# Patient Record
Sex: MALE | Race: WHITE | Employment: FULL TIME | RURAL
[De-identification: names, ages, dates, MRNs, and addresses within clinical notes are randomized per-mention and may not be internally consistent; named-entity substitution may affect disease eponyms.]

---

## 2017-08-23 ENCOUNTER — HOSPITAL ENCOUNTER (OUTPATIENT)
Dept: OTHER | Age: 57
Discharge: HOME | End: 2017-08-23
Attending: INTERNAL MEDICINE | Admitting: INTERNAL MEDICINE

## 2017-08-24 LAB — SEDIMENTATION RATE, ERYTHROCYTE: 4 MM/HR (ref 0–10)

## 2017-11-16 ENCOUNTER — HOSPITAL ENCOUNTER (OUTPATIENT)
Dept: OTHER | Age: 57
Discharge: HOME | End: 2017-11-16
Attending: INTERNAL MEDICINE | Admitting: INTERNAL MEDICINE

## 2018-03-30 ENCOUNTER — HOSPITAL ENCOUNTER (OUTPATIENT)
Dept: OTHER | Age: 58
Discharge: HOME | End: 2018-03-30
Attending: INTERNAL MEDICINE | Admitting: INTERNAL MEDICINE

## 2024-05-25 PROBLEM — M06.9 RHEUMATOID ARTHRITIS: Status: ACTIVE | Noted: 2024-05-25

## 2024-05-28 ENCOUNTER — LAB (OUTPATIENT)
Facility: HOSPITAL | Age: 64
End: 2024-05-28
Payer: MEDICARE

## 2024-05-28 ENCOUNTER — OFFICE VISIT (OUTPATIENT)
Age: 64
End: 2024-05-28
Payer: MEDICARE

## 2024-05-28 ENCOUNTER — TELEPHONE (OUTPATIENT)
Age: 64
End: 2024-05-28

## 2024-05-28 VITALS
HEIGHT: 71 IN | BODY MASS INDEX: 26.04 KG/M2 | WEIGHT: 186 LBS | HEART RATE: 82 BPM | SYSTOLIC BLOOD PRESSURE: 140 MMHG | DIASTOLIC BLOOD PRESSURE: 86 MMHG | TEMPERATURE: 98.5 F

## 2024-05-28 DIAGNOSIS — Z79.899 HIGH RISK MEDICATION USE: ICD-10-CM

## 2024-05-28 DIAGNOSIS — Z79.899 IMMUNOSUPPRESSION DUE TO DRUG THERAPY: ICD-10-CM

## 2024-05-28 DIAGNOSIS — M05.79 RHEUMATOID ARTHRITIS INVOLVING MULTIPLE SITES WITH POSITIVE RHEUMATOID FACTOR: ICD-10-CM

## 2024-05-28 DIAGNOSIS — M51.36 DDD (DEGENERATIVE DISC DISEASE), LUMBAR: ICD-10-CM

## 2024-05-28 DIAGNOSIS — D84.821 IMMUNOSUPPRESSION DUE TO DRUG THERAPY: ICD-10-CM

## 2024-05-28 DIAGNOSIS — M05.79 RHEUMATOID ARTHRITIS INVOLVING MULTIPLE SITES WITH POSITIVE RHEUMATOID FACTOR: Primary | ICD-10-CM

## 2024-05-28 LAB
ALBUMIN SERPL-MCNC: 3.9 G/DL (ref 3.5–5.2)
ALBUMIN/GLOB SERPL: 1.3 G/DL
ALP SERPL-CCNC: 120 U/L (ref 39–117)
ALT SERPL W P-5'-P-CCNC: 25 U/L (ref 1–41)
ANION GAP SERPL CALCULATED.3IONS-SCNC: 8.9 MMOL/L (ref 5–15)
AST SERPL-CCNC: 24 U/L (ref 1–40)
BILIRUB SERPL-MCNC: 0.4 MG/DL (ref 0–1.2)
BUN SERPL-MCNC: 15 MG/DL (ref 8–23)
BUN/CREAT SERPL: 16.1 (ref 7–25)
CALCIUM SPEC-SCNC: 9 MG/DL (ref 8.6–10.5)
CHLORIDE SERPL-SCNC: 107 MMOL/L (ref 98–107)
CO2 SERPL-SCNC: 23.1 MMOL/L (ref 22–29)
CREAT SERPL-MCNC: 0.93 MG/DL (ref 0.76–1.27)
DEPRECATED RDW RBC AUTO: 43.1 FL (ref 37–54)
EGFRCR SERPLBLD CKD-EPI 2021: 91.1 ML/MIN/1.73
ERYTHROCYTE [DISTWIDTH] IN BLOOD BY AUTOMATED COUNT: 13.4 % (ref 12.3–15.4)
ERYTHROCYTE [SEDIMENTATION RATE] IN BLOOD: 32 MM/HR (ref 0–20)
GLOBULIN UR ELPH-MCNC: 3 GM/DL
GLUCOSE SERPL-MCNC: 85 MG/DL (ref 65–99)
HCT VFR BLD AUTO: 46.2 % (ref 37.5–51)
HGB BLD-MCNC: 15.1 G/DL (ref 13–17.7)
MCH RBC QN AUTO: 28.7 PG (ref 26.6–33)
MCHC RBC AUTO-ENTMCNC: 32.7 G/DL (ref 31.5–35.7)
MCV RBC AUTO: 87.7 FL (ref 79–97)
PLATELET # BLD AUTO: 198 10*3/MM3 (ref 140–450)
PMV BLD AUTO: 10.8 FL (ref 6–12)
POTASSIUM SERPL-SCNC: 4.2 MMOL/L (ref 3.5–5.2)
PROT SERPL-MCNC: 6.9 G/DL (ref 6–8.5)
RBC # BLD AUTO: 5.27 10*6/MM3 (ref 4.14–5.8)
SODIUM SERPL-SCNC: 139 MMOL/L (ref 136–145)
WBC NRBC COR # BLD AUTO: 8.63 10*3/MM3 (ref 3.4–10.8)

## 2024-05-28 PROCEDURE — 85027 COMPLETE CBC AUTOMATED: CPT

## 2024-05-28 PROCEDURE — G2211 COMPLEX E/M VISIT ADD ON: HCPCS | Performed by: INTERNAL MEDICINE

## 2024-05-28 PROCEDURE — 85652 RBC SED RATE AUTOMATED: CPT

## 2024-05-28 PROCEDURE — 86431 RHEUMATOID FACTOR QUANT: CPT

## 2024-05-28 PROCEDURE — 80053 COMPREHEN METABOLIC PANEL: CPT

## 2024-05-28 PROCEDURE — 85007 BL SMEAR W/DIFF WBC COUNT: CPT

## 2024-05-28 PROCEDURE — 99214 OFFICE O/P EST MOD 30 MIN: CPT | Performed by: INTERNAL MEDICINE

## 2024-05-28 PROCEDURE — 86480 TB TEST CELL IMMUN MEASURE: CPT

## 2024-05-28 PROCEDURE — 1160F RVW MEDS BY RX/DR IN RCRD: CPT | Performed by: INTERNAL MEDICINE

## 2024-05-28 PROCEDURE — 36415 COLL VENOUS BLD VENIPUNCTURE: CPT

## 2024-05-28 PROCEDURE — 86200 CCP ANTIBODY: CPT

## 2024-05-28 PROCEDURE — 1159F MED LIST DOCD IN RCRD: CPT | Performed by: INTERNAL MEDICINE

## 2024-05-28 RX ORDER — SYRINGE WITH NEEDLE, 1 ML 28GX1/2"
1 SYRINGE, EMPTY DISPOSABLE MISCELLANEOUS WEEKLY
Qty: 12 EACH | Refills: 3 | Status: SHIPPED | OUTPATIENT
Start: 2024-05-28

## 2024-05-28 RX ORDER — FOLIC ACID 1 MG/1
1 TABLET ORAL DAILY
Qty: 90 TABLET | Refills: 3 | Status: SHIPPED | OUTPATIENT
Start: 2024-05-28

## 2024-05-28 RX ORDER — METHOTREXATE SODIUM 25 MG/ML
INJECTION, SOLUTION INTRA-ARTERIAL; INTRAMUSCULAR; INTRAVENOUS
Qty: 4 ML | Refills: 3
Start: 2024-05-28

## 2024-05-28 RX ORDER — FOLIC ACID 1 MG/1
1 TABLET ORAL DAILY
COMMUNITY
End: 2024-05-28 | Stop reason: SDUPTHER

## 2024-05-28 RX ORDER — DICLOFENAC SODIUM 75 MG/1
75 TABLET, DELAYED RELEASE ORAL 2 TIMES DAILY
Qty: 60 TABLET | Refills: 5 | Status: SHIPPED | OUTPATIENT
Start: 2024-05-28

## 2024-05-28 NOTE — PROGRESS NOTES
Office Follow Up      Date: 05/28/2024   Patient Name: Terry Oconnor  MRN: [id number]  YOB: 1960    Referring Physician: Scout Ochoa PA     Chief Complaint:   Chief Complaint   Patient presents with    Rheumatoid Arthritis       History of Present Illness: Terry Oconnor is a 63 y.o. male who is here today for follow up on seropositive rheumatoid arthritis.     He returns after 10-month absence from clinic.  He is not doing as well recently.  He continues on methotrexate.  He ran out of Rinvoq months ago due to lack of follow-up and labs  Reports increasing peripheral joint pain and extended morning stiffness greater than 1 hour since running out of the Rinvoq.  Also increased rheumatoid nodules elbows and hands.  He wants to consider getting back on Rinvoq.  He was previously getting Rinvoq through [employer] assistance.  He has rheumatoid nodules over his joints including hands, elbows, ankles, feet.    He continues to enjoy fishing with his son      Subjective       Review of Systems: Review of Systems   Constitutional:  Negative for chills, fatigue, fever and unexpected weight loss.   HENT:  Negative for mouth sores, sinus pressure and sore throat.         Dry mouth  Nose sores   Eyes:  Negative for pain and redness.        Dry eyes   Respiratory:  Negative for cough and shortness of breath.    Cardiovascular:  Negative for chest pain.   Gastrointestinal:  Negative for abdominal pain, blood in stool, diarrhea, nausea, vomiting and GERD.   Endocrine: Negative for polydipsia and polyuria.   Genitourinary:  Negative for dysuria, genital sores and hematuria.   Musculoskeletal:  Positive for arthralgias, back pain and joint swelling. Negative for myalgias, neck pain and neck stiffness.   Skin:  Negative for rash and bruise.        Psoriasis  Photosensitvity  Malar rash   Allergic/Immunologic: Negative for immunocompromised state.   Neurological:  Negative for seizures, weakness,  "numbness and memory problem.   Hematological:  Negative for adenopathy. Does not bruise/bleed easily.   Psychiatric/Behavioral:  Negative for depressed mood. The patient is not nervous/anxious.         Medications:   Current Outpatient Medications:     ASPIRIN 81 PO, Take 1 tablet by mouth Daily. Aspir-81 mg tablet,delayed release, Disp: , Rfl:     diclofenac (VOLTAREN) 75 MG EC tablet, Take 1 tablet by mouth 2 (Two) Times a Day. for joint pain, Disp: 60 tablet, Rfl: 5    folic acid (FOLVITE) 1 MG tablet, Take 1 tablet by mouth Daily., Disp: 90 tablet, Rfl: 3    Methotrexate Sodium syringe, inject 1 milliliter by subQ route  every week, Disp: 4 mL, Rfl: 3    Tuberculin-Allergy Syringes (B-D ALLERGY SYRINGE 1CC/28G) 28G X 1/2\" 1 ML misc, Use 1 each 1 (One) Time Per Week. use once a week with methotrexate injection, Disp: 12 each, Rfl: 3    Upadacitinib ER (Rinvoq) 15 MG tablet sustained-release 24 hour, Take 1 tablet by mouth Daily. (Patient not taking: Reported on 5/28/2024), Disp: , Rfl:     Allergies: No Known Allergies    I have reviewed and updated the patient's chief complaint, history of present illness, review of systems, past medical history, surgical history, family history, social history, medications and allergy list as appropriate.     Objective        Vital Signs:   Vitals:    05/28/24 1137   BP: 140/86   BP Location: Right arm   Patient Position: Sitting   Cuff Size: Adult   Pulse: 82   Temp: 98.5 °F (36.9 °C)   Weight: 84.4 kg (186 lb)   Height: 179.1 cm (70.5\")   PainSc:   8     Body mass index is 26.31 kg/m².        Metrics  WILKERSON-28 (ESR): --  WILKERSON-28 (CRP): --  Tender (WILKERSON-28): 12 / 28   Swollen (WILKERSON-28): 4 / 28     This Exam  Provider Global: --  Patient Global: --  ESR: --  CRP: --           Physical Exam:  Physical Exam   MUSCULOSKELETAL:   See grid  Rheumatoid nodules scattered over the finger joints  Large rheumatoid nodules present extensor surface right elbow    Complete joint exam was " "performed including the MCPs, PIPs, DIPs of the hands, wrists, elbows, shoulders, hips, knees and ankles.  No soft tissue swelling or tenderness is present except as above.    General: The patient is well-developed and well nourished. Cooperative, alert and oriented. Affect is normal. Hydration appears normal.   HEENT: Normocephalic and atraumatic. No notable alopecia. Lids and conjunctiva are normal. Pupils are equal and sclera are clear. Oropharynx is clear   NECK neck is supple without adenopathy, masses or thyromegaly.   CARDIOVASCULAR: Regular rate and rhythm. No murmurs, rubs or gallops   LUNGS: Effort is normal. Lungs are clear bilateral   ABDOMEN: Not examined  EXTREMITIES: Peripheral pulses are intact. No clubbing.   SKIN: No rashes. No subcutaneous nodules. No digital ulcers. No sclerodactyly.   NEUROLOGIC: Gait is normal. Strength testing is normal.  No focal neurologic deficits          Results Review:   Labs:   No results found for: \"GLUCOSE\", \"BUN\", \"CREATININE\", \"EGFRRESULT\", \"EGFR\", \"BCR\", \"K\", \"CO2\", \"CALCIUM\", \"PROTENTOTREF\", \"ALBUMIN\", \"BILITOT\", \"AST\", \"ALT\"  No results found for: \"WBC\", \"HGB\", \"HCT\", \"MCV\", \"PLT\"  No results found for: \"SEDRATE\"  No results found for: \"CRP\"  No results found for: \"QUANTIFERO\", \"QUANTITB1\", \"QUANTITB2\", \"QUANTIFERN\", \"QUANTIFERM\", \"QUANTITBGLDP\"  No results found for: \"RF\"  No results found for: \"HEPBSAG\", \"HEPAIGM\", \"HEPBIGMCORE\", \"HEPCVIRUSABY\"      Procedures    Assessment / Plan        Assessment & Plan  Rheumatoid arthritis involving multiple sites with positive rheumatoid factor  [place] in [place]; son in his 20s   CCP+; + RA start 2010; severely nodular  **Current:  SubQ methotrexate 2011,                        Rinvoq 8/22([employer] assistance)  Prior Xeljanz XR ( 6/2020-8/22 expensive and stopped working)  Prior Humira 10/19-6/2020 ( no response)  Prior 1 month Xeljanz 8/26/2019 -insurance denied. Restarted 6/2020  prior Plaquenil started October 26 " 2016-8.18 (no eye exams)  ?Rituxan 2018- approved but patient declined due to cost      Patient returns after 10-month absence from clinic.  High disease activity from rheumatoid arthritis despite continued once weekly subcu methotrexate.    He ran out of Rinvoq many months ago due to lack of follow-up and labs.  Worsening disease activity off Rinvoq  He reports doing better previously with less rheumatoid nodules when he was on Rinvoq, although he thought perhaps it made him dizzy.    Continue methotrexate and prior authorize to restart Rinvoq.  Update labs today as below including QuantiFERON  Discussed again the importance of labs every 3 months on these medications for monitoring.  His daughter is present throughout the exam and discussion today  Return to clinic 3 months    High risk medication use  QTB 5/28/24, hepatitis panel 2/16/2023 negative    Well tolerated and effective. Labs every 2-3 months for monitoring CBC CMP  Risks of methotrexate include but are not limited to severe liver damage that can be fatal, the possible need for liver biopsy, bone marrow suppression that can lead to dangerously low blood counts, GI side effects including mouth sores and diarrhea, fatigue, and rare risk of severe pulmonary complications. There should be no alcohol consumed with MTX. MTX can cause severe fetal abnormalities whether the mother or father is taking the medication and thus must be avoided if pregnancy is a possibility.  All medication is to be taken one day a week only. The need for q 8 week labs and the need for folic acid supplementation were discussed  Immunosuppression due to drug therapy  Standing lab order given for labs every 2-3 months for monitoring CBC CMP  Labs reviewed and are stable  Rinvoq has the risk of lymphopenia, neutropenia, anemia, elevation of liver enzymes, elevation of lipids, serious infection, GI perforations, viral reactivation,TB reactivation, malignancies, and renal abnormalities.   "We also discussed black box warnings of increased cardiovascular risks and increased risk of cancers.  Live vaccines should not be taken with this medication. Regular monitoring is required and compliance with this is imperative. This was all discussed at length.  DDD (degenerative disc disease), lumbar  Stable chronic back pain.  Continue p.r.n. NSAID  Risks of NSAIDs discussed including GI upset, GI bleeding, renal and hepatic risks and the risks of cardiovascular disease and stroke. Warned patient not to take with other NSAIDs including OTC NSAIDs..      Orders Placed This Encounter   Procedures    Comprehensive Metabolic Panel    QuantiFERON TB Plus Client Incubated    Rheumatoid Factor    Cyclic Citrul Peptide Antibody, IgG / IgA    Sedimentation Rate    CBC With Manual Differential     New Medications Ordered This Visit   Medications    diclofenac (VOLTAREN) 75 MG EC tablet     Sig: Take 1 tablet by mouth 2 (Two) Times a Day. for joint pain     Dispense:  60 tablet     Refill:  5    folic acid (FOLVITE) 1 MG tablet     Sig: Take 1 tablet by mouth Daily.     Dispense:  90 tablet     Refill:  3    Methotrexate Sodium syringe     Sig: inject 1 milliliter by subQ route  every week     Dispense:  4 mL     Refill:  3    Tuberculin-Allergy Syringes (B-D ALLERGY SYRINGE 1CC/28G) 28G X 1/2\" 1 ML misc     Sig: Use 1 each 1 (One) Time Per Week. use once a week with methotrexate injection     Dispense:  12 each     Refill:  3           Follow Up:   Return in about 3 months (around 8/28/2024).        Jabari White MD  [facility]   "

## 2024-05-28 NOTE — ASSESSMENT & PLAN NOTE
[place] in [place]; son in his 20s   CCP+; + RA start 2010; severely nodular  **Current:  SubQ methotrexate 2011,                        Rinvoq 8/22([employer] assistance)  Prior Xeljanz XR ( 6/2020-8/22 expensive and stopped working)  Prior Humira 10/19-6/2020 ( no response)  Prior 1 month Xeljanz 8/26/2019 -insurance denied. Restarted 6/2020  prior Plaquenil started October 26 2016-8.18 (no eye exams)  ?Rituxan 2018- approved but patient declined due to cost      Patient returns after 10-month absence from clinic.  High disease activity from rheumatoid arthritis despite continued once weekly subcu methotrexate.    He ran out of Rinvoq many months ago due to lack of follow-up and labs.  Worsening disease activity off Rinvoq  He reports doing better previously with less rheumatoid nodules when he was on Rinvoq, although he thought perhaps it made him dizzy.    Continue methotrexate and prior authorize to restart Rinvoq.  Update labs today as below including QuantiFERON  Discussed again the importance of labs every 3 months on these medications for monitoring.  His daughter is present throughout the exam and discussion today  Return to clinic 3 months

## 2024-05-28 NOTE — TELEPHONE ENCOUNTER
prior authorize to restart Rinvoq for rheumatoid arthritis  Previously was getting Rinvoq through [employer] assistance in 2023, but has been out for many months.   Previously failed methotrexate, Plaquenil, Humira, Xeljanz

## 2024-05-29 ENCOUNTER — SPECIALTY PHARMACY (OUTPATIENT)
Age: 64
End: 2024-05-29
Payer: MEDICARE

## 2024-05-29 LAB
BASOPHILS # BLD MANUAL: 0 10*3/MM3 (ref 0–0.2)
BASOPHILS NFR BLD MANUAL: 0 % (ref 0–1.5)
CHROMATIN AB SERPL-ACNC: 621.2 IU/ML (ref 0–14)
EOSINOPHIL # BLD MANUAL: 0.78 10*3/MM3 (ref 0–0.4)
EOSINOPHIL NFR BLD MANUAL: 9 % (ref 0.3–6.2)
LYMPHOCYTES # BLD MANUAL: 2.16 10*3/MM3 (ref 0.7–3.1)
LYMPHOCYTES NFR BLD MANUAL: 10 % (ref 5–12)
MONOCYTES # BLD: 0.86 10*3/MM3 (ref 0.1–0.9)
NEUTROPHILS # BLD AUTO: 4.83 10*3/MM3 (ref 1.7–7)
NEUTROPHILS NFR BLD MANUAL: 56 % (ref 42.7–76)
PLAT MORPH BLD: NORMAL
POIKILOCYTOSIS BLD QL SMEAR: ABNORMAL
POLYCHROMASIA BLD QL SMEAR: ABNORMAL
VARIANT LYMPHS NFR BLD MANUAL: 20 % (ref 19.6–45.3)
VARIANT LYMPHS NFR BLD MANUAL: 5 % (ref 0–5)
WBC MORPH BLD: NORMAL

## 2024-05-30 LAB
CCP IGA+IGG SERPL IA-ACNC: >250 UNITS (ref 0–19)
GAMMA INTERFERON BACKGROUND BLD IA-ACNC: 0.01 IU/ML
M TB IFN-G BLD-IMP: NEGATIVE
M TB IFN-G CD4+ BCKGRND COR BLD-ACNC: 0.02 IU/ML
M TB IFN-G CD4+CD8+ BCKGRND COR BLD-ACNC: 0.05 IU/ML
MITOGEN IGNF BCKGRD COR BLD-ACNC: >10 IU/ML
SERVICE CMNT-IMP: NORMAL

## 2024-07-29 ENCOUNTER — TELEPHONE (OUTPATIENT)
Age: 64
End: 2024-07-29
Payer: MEDICARE

## 2024-09-02 ENCOUNTER — SPECIALTY PHARMACY (OUTPATIENT)
Age: 64
End: 2024-09-02
Payer: MEDICARE

## 2024-09-03 ENCOUNTER — OFFICE VISIT (OUTPATIENT)
Age: 64
End: 2024-09-03
Payer: MEDICARE

## 2024-09-03 ENCOUNTER — LAB (OUTPATIENT)
Facility: HOSPITAL | Age: 64
End: 2024-09-03
Payer: MEDICARE

## 2024-09-03 VITALS
DIASTOLIC BLOOD PRESSURE: 84 MMHG | BODY MASS INDEX: 26.02 KG/M2 | TEMPERATURE: 98.4 F | HEIGHT: 71 IN | HEART RATE: 72 BPM | WEIGHT: 185.9 LBS | SYSTOLIC BLOOD PRESSURE: 144 MMHG

## 2024-09-03 DIAGNOSIS — M15.9 GENERALIZED OSTEOARTHROSIS, INVOLVING MULTIPLE SITES: ICD-10-CM

## 2024-09-03 DIAGNOSIS — M05.79 RHEUMATOID ARTHRITIS INVOLVING MULTIPLE SITES WITH POSITIVE RHEUMATOID FACTOR: Primary | ICD-10-CM

## 2024-09-03 DIAGNOSIS — D84.821 IMMUNOSUPPRESSION DUE TO DRUG THERAPY: ICD-10-CM

## 2024-09-03 DIAGNOSIS — Z79.899 HIGH RISK MEDICATION USE: ICD-10-CM

## 2024-09-03 DIAGNOSIS — Z79.899 IMMUNOSUPPRESSION DUE TO DRUG THERAPY: ICD-10-CM

## 2024-09-03 DIAGNOSIS — M05.79 RHEUMATOID ARTHRITIS INVOLVING MULTIPLE SITES WITH POSITIVE RHEUMATOID FACTOR: ICD-10-CM

## 2024-09-03 PROCEDURE — 1159F MED LIST DOCD IN RCRD: CPT | Performed by: INTERNAL MEDICINE

## 2024-09-03 PROCEDURE — 85025 COMPLETE CBC W/AUTO DIFF WBC: CPT

## 2024-09-03 PROCEDURE — 80053 COMPREHEN METABOLIC PANEL: CPT

## 2024-09-03 PROCEDURE — 36415 COLL VENOUS BLD VENIPUNCTURE: CPT

## 2024-09-03 PROCEDURE — G2211 COMPLEX E/M VISIT ADD ON: HCPCS | Performed by: INTERNAL MEDICINE

## 2024-09-03 PROCEDURE — 85652 RBC SED RATE AUTOMATED: CPT

## 2024-09-03 PROCEDURE — 99214 OFFICE O/P EST MOD 30 MIN: CPT | Performed by: INTERNAL MEDICINE

## 2024-09-03 PROCEDURE — 1160F RVW MEDS BY RX/DR IN RCRD: CPT | Performed by: INTERNAL MEDICINE

## 2024-09-03 PROCEDURE — 86140 C-REACTIVE PROTEIN: CPT

## 2024-09-03 RX ORDER — METHOTREXATE 25 MG/ML
25 INJECTION, SOLUTION INTRA-ARTERIAL; INTRAMUSCULAR; INTRAVENOUS WEEKLY
Qty: 12 ML | Refills: 0 | Status: SHIPPED | OUTPATIENT
Start: 2024-09-03 | End: 2024-09-03 | Stop reason: SDUPTHER

## 2024-09-03 RX ORDER — METHOTREXATE 25 MG/ML
25 INJECTION, SOLUTION INTRA-ARTERIAL; INTRAMUSCULAR; INTRAVENOUS WEEKLY
Qty: 12 ML | Refills: 0 | Status: SHIPPED | OUTPATIENT
Start: 2024-09-03

## 2024-09-03 RX ORDER — SYRINGE WITH NEEDLE, 1 ML 28GX1/2"
1 SYRINGE, EMPTY DISPOSABLE MISCELLANEOUS WEEKLY
Qty: 12 EACH | Refills: 3 | Status: SHIPPED | OUTPATIENT
Start: 2024-09-03

## 2024-09-03 RX ORDER — METHOTREXATE 25 MG/ML
25 INJECTION, SOLUTION INTRA-ARTERIAL; INTRAMUSCULAR; INTRAVENOUS WEEKLY
COMMUNITY
End: 2024-09-03 | Stop reason: SDUPTHER

## 2024-09-03 RX ORDER — UPADACITINIB 15 MG/1
1 TABLET, EXTENDED RELEASE ORAL DAILY
Qty: 30 TABLET | Refills: 5 | Status: SHIPPED | OUTPATIENT
Start: 2024-09-03

## 2024-09-03 NOTE — PROGRESS NOTES
Office Follow Up      Date: 09/03/2024   Patient Name: Terry Oconnor  MRN: [id number]  YOB: 1960    Referring Physician: No ref. provider found     Chief Complaint:   Chief Complaint   Patient presents with    Rheumatoid Arthritis       History of Present Illness: Terry Oconnor is a 63 y.o. male who is here today for follow up on seropositive rheumatoid arthritis.     Doing some better since starting back on Rinvoq.  Gets Rinvoq through [employer] assistance.  He still has not been able to restart methotrexate weekly injections due to his pharmacy not having it.  He wonders if mail-order might have the liquid methotrexate.    He has rheumatoid nodules over his joints including hands, elbows, ankles, feet.  Worsening pain and swelling in his foot recently    He continues to enjoy fishing with his son      Subjective       Review of Systems: Review of Systems   Constitutional:  Negative for chills, fatigue, fever and unexpected weight loss.   HENT:  Negative for mouth sores, sinus pressure and sore throat.         Dry mouth  Nose sores   Eyes:  Negative for pain and redness.        Dry eyes   Respiratory:  Negative for cough and shortness of breath.    Cardiovascular:  Negative for chest pain.   Gastrointestinal:  Negative for abdominal pain, blood in stool, diarrhea, nausea, vomiting and GERD.   Endocrine: Negative for polydipsia and polyuria.   Genitourinary:  Negative for dysuria, genital sores and hematuria.   Musculoskeletal:  Positive for joint swelling. Negative for arthralgias, back pain, myalgias, neck pain and neck stiffness.   Skin:  Negative for rash and bruise.        Psoriasis  Photosensitvity  Malar rash   Allergic/Immunologic: Negative for immunocompromised state.   Neurological:  Positive for weakness and numbness. Negative for seizures and memory problem.   Hematological:  Negative for adenopathy. Does not bruise/bleed easily.   Psychiatric/Behavioral:  Negative  "for depressed mood. The patient is not nervous/anxious.         Medications:   Current Outpatient Medications:     ASPIRIN 81 PO, Take 1 tablet by mouth Daily. Aspir-81 mg tablet,delayed release, Disp: , Rfl:     diclofenac (VOLTAREN) 75 MG EC tablet, Take 1 tablet by mouth 2 (Two) Times a Day. for joint pain, Disp: 60 tablet, Rfl: 5    folic acid (FOLVITE) 1 MG tablet, Take 1 tablet by mouth Daily., Disp: 90 tablet, Rfl: 3    Methotrexate Sodium 50 MG/2ML injection, Inject 1 mL under the skin into the appropriate area as directed 1 (One) Time Per Week., Disp: 12 mL, Rfl: 0    Tuberculin-Allergy Syringes (B-D ALLERGY SYRINGE 1CC/28G) 28G X 1/2\" 1 ML misc, Use 1 each 1 (One) Time Per Week. use once a week with methotrexate injection, Disp: 12 each, Rfl: 3    Upadacitinib ER (Rinvoq) 15 MG tablet sustained-release 24 hour, Take 1 tablet by mouth Daily., Disp: 30 tablet, Rfl: 5    Allergies: No Known Allergies    I have reviewed and updated the patient's chief complaint, history of present illness, review of systems, past medical history, surgical history, family history, social history, medications and allergy list as appropriate.     Objective        Vital Signs:   Vitals:    09/03/24 1605   BP: 144/84   BP Location: Left arm   Patient Position: Sitting   Cuff Size: Adult   Pulse: 72   Temp: 98.4 °F (36.9 °C)   Weight: 84.3 kg (185 lb 14.4 oz)   Height: 179.1 cm (70.5\")   PainSc:   3     Body mass index is 26.3 kg/m².      Physical Exam:  Physical Exam   MUSCULOSKELETAL:     Rheumatoid nodules scattered over the finger joints  Large rheumatoid nodules present extensor surface right elbow  No synovitis in the hands wrist or upper joints.  Hammertoes present feet with synovitis present throughout the MTP joints left foot    Complete joint exam was performed including the MCPs, PIPs, DIPs of the hands, wrists, elbows, shoulders, hips, knees and ankles.  No soft tissue swelling or tenderness is present except as " "above.    General: The patient is well-developed and well nourished. Cooperative, alert and oriented. Affect is normal. Hydration appears normal.   HEENT: Normocephalic and atraumatic. No notable alopecia. Lids and conjunctiva are normal. Pupils are equal and sclera are clear. Oropharynx is clear   NECK neck is supple without adenopathy, masses or thyromegaly.   CARDIOVASCULAR: Regular rate and rhythm. No murmurs, rubs or gallops   LUNGS: Effort is normal. Lungs are clear bilateral   ABDOMEN: Not examined  EXTREMITIES: Peripheral pulses are intact. No clubbing.   SKIN: No rashes. No subcutaneous nodules. No digital ulcers. No sclerodactyly.   NEUROLOGIC: Gait is normal. Strength testing is normal.  No focal neurologic deficits          Results Review:   Labs:   Lab Results   Component Value Date    GLUCOSE 85 05/28/2024    BUN 15 05/28/2024    CREATININE 0.93 05/28/2024    EGFR 91.1 05/28/2024    BCR 16.1 05/28/2024    K 4.2 05/28/2024    CO2 23.1 05/28/2024    CALCIUM 9.0 05/28/2024    ALBUMIN 3.9 05/28/2024    BILITOT 0.4 05/28/2024    AST 24 05/28/2024    ALT 25 05/28/2024     Lab Results   Component Value Date    WBC 8.63 05/28/2024    HGB 15.1 05/28/2024    HCT 46.2 05/28/2024    MCV 87.7 05/28/2024     05/28/2024     Lab Results   Component Value Date    SEDRATE 32 (H) 05/28/2024     No results found for: \"CRP\"  Lab Results   Component Value Date    QUANTIFERO Comment 05/28/2024    QUANTITB1 0.02 05/28/2024    QUANTITB2 0.05 05/28/2024    QUANTIFERN 0.01 05/28/2024    QUANTIFERM >10.00 05/28/2024    QUANTITBGLDP Negative 05/28/2024     No results found for: \"RF\"  No results found for: \"HEPBSAG\", \"HEPAIGM\", \"HEPBIGMCORE\", \"HEPCVIRUSABY\"      Procedures    Assessment / Plan        Assessment & Plan  Rheumatoid arthritis involving multiple sites with positive rheumatoid factor  [place]; son in his 20s   CCP+; + RA start 2010; severely nodular  **Current:  SubQ methotrexate 2011,            "             Rinvoq 8/22  Prior Xeljanz XR ( 6/2020-8/22 expensive and stopped working)  Prior Humira 10/19-6/2020 ( no response)  Prior 1 month Xeljanz 8/26/2019 -insurance denied. Restarted 6/2020  prior Plaquenil started October 26 2016-8.18 (no eye exams)  ?Rituxan 2018- approved but patient declined due to cost    Improved but still with smoldering disease activity left foot today on Rinvoq through [employer] assistance  He has not been able to get liquid methotrexate recently as his pharmacy did not have it  -Continue daily Rinvoq  -Will send liquid methotrexate for weekly injection to mail-order pharmacy for him  Medication refilled.    Labs reviewed and are stable  Continue labs every 3 months for monitoring.  No signs of toxicity.  Continue p.r.n. NSAID.  .  Return to clinic 3 months    High risk medication use  MTX Rinvoq  Hepatitis panel - 2/16/2023  Q TB -5/28/2024    Well tolerated and effective. Labs every 3 months for monitoring CBC CMP  -Intermittent lipid panel for monitoring on Rinvoq  Risks of methotrexate include but are not limited to severe liver damage that can be fatal, the possible need for liver biopsy, bone marrow suppression that can lead to dangerously low blood counts, GI side effects including mouth sores and diarrhea, fatigue, and rare risk of severe pulmonary complications. There should be no alcohol consumed with MTX. MTX can cause severe fetal abnormalities whether the mother or father is taking the medication and thus must be avoided if pregnancy is a possibility.  All medication is to be taken one day a week only. The need for q 8-12 week labs and the need for folic acid supplementation were discussed..      Rinvoq has the risk of lymphopenia, neutropenia, anemia, elevation of liver enzymes, elevation of lipids, serious infection, GI perforations, viral reactivation,TB reactivation, malignancies, and renal abnormalities.  We also discussed black box warnings of increased  "cardiovascular risks and increased risk of cancers.  Live vaccines should not be taken with this medication. Regular monitoring is required and compliance with this is imperative. This was all discussed at length.  Immunosuppression due to drug therapy    Generalized osteoarthrosis, involving multiple sites  Stable chronic back pain.  Continue p.r.n. NSAID  Risks of NSAIDs discussed including GI upset, GI bleeding, renal and hepatic risks and the risks of cardiovascular disease and stroke. Warned patient not to take with other NSAIDs including OTC NSAIDs..        Orders Placed This Encounter   Procedures    CBC Auto Differential    Comprehensive Metabolic Panel    C-reactive Protein    Sedimentation Rate     New Medications Ordered This Visit   Medications    Methotrexate Sodium 50 MG/2ML injection     Sig: Inject 1 mL under the skin into the appropriate area as directed 1 (One) Time Per Week.     Dispense:  12 mL     Refill:  0    Upadacitinib ER (Rinvoq) 15 MG tablet sustained-release 24 hour     Sig: Take 1 tablet by mouth Daily.     Dispense:  30 tablet     Refill:  5    Tuberculin-Allergy Syringes (B-D ALLERGY SYRINGE 1CC/28G) 28G X 1/2\" 1 ML misc     Sig: Use 1 each 1 (One) Time Per Week. use once a week with methotrexate injection     Dispense:  12 each     Refill:  3           Follow Up:   Return in about 3 months (around 12/3/2024).        Jabari White MD  [facility]   "

## 2024-09-03 NOTE — ASSESSMENT & PLAN NOTE
[place]; son in his 20s   CCP+; + RA start 2010; severely nodular  **Current:  SubQ methotrexate 2011,                        Rinvoq 8/22  Prior Xeljanz XR ( 6/2020-8/22 expensive and stopped working)  Prior Humira 10/19-6/2020 ( no response)  Prior 1 month Xeljanz 8/26/2019 -insurance denied. Restarted 6/2020  prior Plaquenil started October 26 2016-8.18 (no eye exams)  ?Rituxan 2018- approved but patient declined due to cost    Improved but still with smoldering disease activity left foot today on Rinvoq through [employer] assistance  He has not been able to get liquid methotrexate recently as his pharmacy did not have it  -Continue daily Rinvoq  -Will send liquid methotrexate for weekly injection to mail-order pharmacy for him  Medication refilled.    Labs reviewed and are stable  Continue labs every 3 months for monitoring.  No signs of toxicity.  Continue p.r.n. NSAID.  .  Return to clinic 3 months    
MTX Rinvoq  Hepatitis panel - 2/16/2023  Q TB -5/28/2024    Well tolerated and effective. Labs every 3 months for monitoring CBC CMP  -Intermittent lipid panel for monitoring on Rinvoq  Risks of methotrexate include but are not limited to severe liver damage that can be fatal, the possible need for liver biopsy, bone marrow suppression that can lead to dangerously low blood counts, GI side effects including mouth sores and diarrhea, fatigue, and rare risk of severe pulmonary complications. There should be no alcohol consumed with MTX. MTX can cause severe fetal abnormalities whether the mother or father is taking the medication and thus must be avoided if pregnancy is a possibility.  All medication is to be taken one day a week only. The need for q 8-12 week labs and the need for folic acid supplementation were discussed..      Rinvoq has the risk of lymphopenia, neutropenia, anemia, elevation of liver enzymes, elevation of lipids, serious infection, GI perforations, viral reactivation,TB reactivation, malignancies, and renal abnormalities.  We also discussed black box warnings of increased cardiovascular risks and increased risk of cancers.  Live vaccines should not be taken with this medication. Regular monitoring is required and compliance with this is imperative. This was all discussed at length.  
Stable chronic back pain.  Continue p.r.n. NSAID  Risks of NSAIDs discussed including GI upset, GI bleeding, renal and hepatic risks and the risks of cardiovascular disease and stroke. Warned patient not to take with other NSAIDs including OTC NSAIDs..      
- - -

## 2024-09-03 NOTE — PATIENT INSTRUCTIONS
Rheumatoid Arthritis    Rheumatoid arthritis (RA) is a long-term (chronic) disease that causes inflammation in the joints. RA may start slowly. It most often affects the small joints of the hands and feet. Usually, the same joints are affected on both sides of the body. Inflammation from RA can also affect other parts of the body, including the heart, eyes, or lungs.  There is no cure for RA, but medicines can help your symptoms and stop or slow down the progression of the disease.    What are the causes?  RA is an autoimmune disease. When you have an autoimmune disease, your body's defense system (immune system) mistakenly attacks healthy body tissues. The exact cause of RA is not known.    What increases the risk?  The following factors may make you more likely to develop this condition:  Being female.  Having a family history of RA or other autoimmune diseases.  Having a history of smoking.  Being obese.  Having been exposed to pollutants or chemicals.    What are the signs or symptoms?  Symptoms of this condition usually start gradually. They are often worse in the morning. The first symptom may be morning stiffness that lasts longer than 30 minutes.    As RA progresses, symptoms may include:  Pain, stiffness, swelling, warmth, and tenderness in joints on both sides of your body.  Loss of energy.  Loss of appetite.  Weight loss.  Low-grade fever.  Dry eyes and dry mouth.  Firm lumps (rheumatoid nodules) that grow beneath your skin in areas such as your forearm bones near your elbows and on your hands.  Changes in the appearance of joints (deformity) and loss of joint function.    Symptoms of this condition vary from person to person.  Symptoms of RA often come and go.  Sometimes, symptoms get worse for a period of time. These are called flares.    How is this diagnosed?  This condition is diagnosed based on your symptoms, medical history, and a physical exam. You may have X-rays or an MRI to check for the type  "of joint changes that are caused by RA.  You may also have blood tests to look for:  Proteins (antibodies) that your immune system may make if you have RA. These include rheumatoid factor (RF) and anti-CCP.  When blood tests show these proteins, you are said to have \"seropositive RA.\"  When blood tests do not show these proteins, you may have \"seronegative RA.\"  Inflammation in your blood.  A low number of red blood cells (anemia).    How is this treated?    The goals of treatment are to relieve pain, reduce inflammation, and slow down or stop joint damage and disability. Treatment may include:  Lifestyle changes. It is important to rest as needed, eat a healthy diet, and exercise.  Medicines. Your health care provider may adjust your medicines every 3 months until treatment goals are reached. Common medicines include:  Pain relievers (analgesics).  Corticosteroids and NSAIDs, such as ibuprofen, to reduce inflammation.  Disease-modifying antirheumatic drugs (DMARDs) to try to slow the course of the disease.  Biologic response modifiers to reduce inflammation and damage.  Physical therapy and occupational therapy.  Surgery, if you have severe joint damage. Joint replacement or fusing of joints may be needed.  Your health care provider will work with you to identify the best treatment option for you based on assessment of the overall disease activity in your body.    Follow these instructions at home:    Managing pain, stiffness, and swelling    If directed, apply heat to the affected area as often as told by your health care provider. Use the heat source that your health care provider recommends, such as a moist heat pack or a heating pad.  Place a towel between your skin and the heat source.  Leave the heat on for 20-30 minutes.  Remove the heat if your skin turns bright red. This is especially important if you are unable to feel pain, heat, or cold. You have a greater risk of getting burned.    Activity  Return to " your normal activities as told by your health care provider. Ask your health care provider what activities are safe for you.  Rest when you are having a flare.  Start an exercise program as told by your health care provider. This may include physical therapy exercises to maintain movement and strength in your joints.    General instructions  Take over-the-counter and prescription medicines only as told by your health care provider.  Keep all follow-up visits. This is important.    Where to find more information  American College of Rheumatology: rheumatology.org  Arthritis Foundation: arthritis.org    Contact a health care provider if:  You have a flare-up of RA symptoms.  You have a fever.  You have side effects from your medicines.    Get help right away if:  You have chest pain.  You have trouble breathing.  You quickly develop a hot, painful joint that is more severe than your usual joint aches.  These symptoms may be an emergency. Get help right away. Call 911.  Do not wait to see if the symptoms will go away.  Do not drive yourself to the hospital.    Summary  Rheumatoid arthritis (RA) is a long-term (chronic) disease that causes inflammation in the joints.  RA is an autoimmune disease.  The goals of treatment are to relieve pain, reduce inflammation, and slow down or stop joint damage and disability.    This information is not intended to replace advice given to you by your health care provider. Make sure you discuss any questions you have with your health care provider.  Document Revised: 10/20/2022 Document Reviewed: 10/20/2022  "[employer] Patient Education © 2024 "[employer] Inc. Methotrexate Tablets    What is this medication?  METHOTREXATE (METH oh TREX ate) treats autoimmune conditions, such as arthritis and psoriasis. It works by decreasing inflammation, which can reduce pain and prevent long-term injury to the joints and skin. It may also be used to treat some types of cancer. It works by slowing down the  "growth of cancer cells.  This medicine may be used for other purposes; ask your health care provider or pharmacist if you have questions.  COMMON BRAND NAME(S): Rheumatrex, Trexall    What should I tell my care team before I take this medication?  They need to know if you have any of these conditions:  Dehydration  Diabetes  Fluid in the stomach area or lungs  Frequently drink alcohol  Having surgery, including dental surgery  High cholesterol  Immune system problems  Inflammatory bowel disease, such as ulcerative colitis  Kidney disease  Liver disease  Low blood cell levels (white cells, red cells, and platelets)  Lung disease  Recent or ongoing radiation  Recent or upcoming vaccine  Stomach ulcers, other stomach or intestine problems  An unusual or allergic reaction to methotrexate, other medications, foods, dyes, or preservatives  Pregnant or trying to get pregnant  Breastfeeding    How should I use this medication?  Take this medication by mouth with water. Take it as directed on the prescription label. Do not take extra. Keep taking this medication until your care team tells you to stop.  Know why you are taking this medication and how you should take it. To treat conditions such as arthritis and psoriasis, this medication is taken ONCE A WEEK as a single dose or divided into 3 smaller doses taken 12 hours apart (do not take more than 3 doses 12 hours apart each week). This medication is NEVER taken daily to treat conditions other than cancer. Taking this medication more often than directed can cause serious side effects, even death. Talk to your care team about why you are taking this medication, how often you will take it, and what your dose is. Ask your care team to put the reason you take this medication on the prescription.  If you take this medication ONCE A WEEK, choose a day of the week before you start. Ask your pharmacist to include the day of the week on the label. Avoid \"Monday\", which could be " "misread as \"Morning\".  Handling this medication may be harmful. Talk to your care team about how to handle this medication. Special instructions may apply.  Talk to your care team about the use of this medication in children. While it may be prescribed for selected conditions, precautions do apply.  Overdosage: If you think you have taken too much of this medicine contact a poison control center or emergency room at once.  NOTE: This medicine is only for you. Do not share this medicine with others.    What if I miss a dose?  If you miss a dose, talk with your care team. Do not take double or extra doses.    What may interact with this medication?  Do not take this medication with any of the following:  Acitretin  Live virus vaccines  Probenecid  This medication may also interact with the following:  Alcohol  Aspirin and aspirin-like medications  Certain antibiotics, such as penicillin, neomycin, sulfamethoxazole; trimethoprim  Certain medications for stomach problems, such as lansoprazole, omeprazole, pantoprazole  Clozapine  Cyclosporine  Dapsone  Folic acid  Foscarnet  NSAIDs, medications for pain and inflammation, such as ibuprofen or naproxen  Phenytoin  Pyrimethamine  Steroid medications, such as prednisone or cortisone  Tacrolimus  Theophylline  This list may not describe all possible interactions. Give your health care provider a list of all the medicines, herbs, non-prescription drugs, or dietary supplements you use. Also tell them if you smoke, drink alcohol, or use illegal drugs. Some items may interact with your medicine.    What should I watch for while using this medication?  Visit your care team for regular checks on your progress. It may be some time before you see the benefit from this medication.  You may need blood work done while you are taking this medication.  If your care team has also prescribed folic acid, they may instruct you to skip your folic acid dose on the day you take " methotrexate.  This medication can make you more sensitive to the sun. Keep out of the sun. If you cannot avoid being in the sun, wear protective clothing and sunscreen. Do not use sun lamps, tanning beds, or tanning booths.  Check with your care team if you have severe diarrhea, nausea, and vomiting, or if you sweat a lot. The loss of too much body fluid may make it dangerous for you to take this medication.  This medication may increase your risk of getting an infection. Call your care team for advice if you get a fever, chills, sore throat, or other symptoms of a cold or flu. Do not treat yourself. Try to avoid being around people who are sick.  Talk to your care team about your risk of cancer. You may be more at risk for certain types of cancers if you take this medication.  Talk to your care team if you or your partner may be pregnant. Serious birth defects can occur if you take this medication during pregnancy and for 6 months after the last dose. You will need a negative pregnancy test before starting this medication. Contraception is recommended while taking this medication and for 6 months after the last dose. Your care team can help you find the option that works for you.  If your partner can get pregnant, use a condom during sex while taking this medication and for 3 months after the last dose.  Do not breastfeed while taking this medication and for 1 week after the last dose.  This medication may cause infertility. Talk to your care team if you are concerned about your fertility.    What side effects may I notice from receiving this medication?  Side effects that you should report to your care team as soon as possible:  Allergic reactions--skin rash, itching, hives, swelling of the face, lips, tongue, or throat  Dry cough, shortness of breath or trouble breathing  Infection--fever, chills, cough, sore throat, wounds that don't heal, pain or trouble when passing urine, general feeling of discomfort or  being unwell  Kidney injury--decrease in the amount of urine, swelling of the ankles, hands, or feet  Liver injury--right upper belly pain, loss of appetite, nausea, light-colored stool, dark yellow or brown urine, yellowing skin or eyes, unusual weakness or fatigue  Low red blood cell level--unusual weakness or fatigue, dizziness, headache, trouble breathing  Pain, tingling, or numbness in the hands or feet, muscle weakness, change in vision, confusion or trouble speaking, loss of balance or coordination, trouble walking, seizures  Redness, blistering, peeling, or loosening of the skin, including inside the mouth  Stomach bleeding--bloody or black, tar-like stools, vomiting blood or brown material that looks like coffee grounds  Stomach pain that is severe, does not away, or gets worse  Unusual bruising or bleeding  Side effects that usually do not require medical attention (report these to your care team if they continue or are bothersome):  Diarrhea  Dizziness  Hair loss  Nausea  Pain, redness, or swelling with sores inside the mouth or throat  Skin reactions on sun-exposed areas  Vomiting  This list may not describe all possible side effects. Call your doctor for medical advice about side effects. You may report side effects to FDA at 1-986-FDA-8687.    Where should I keep my medication?  Keep out of the reach of children and pets.  Store at room temperature between 20 and 25 degrees C (68 and 77 degrees F). Protect from light. Keep the container tightly closed. Get rid of any unused medication after the expiration date.  To get rid of medications that are no longer needed or have :  Take the medication to a medication take-back program. Check with your pharmacy or law enforcement to find a location.  If you cannot return the medication, ask your pharmacist or care team how to get rid of this medication safely.  NOTE: This sheet is a summary. It may not cover all possible information. If you have  questions about this medicine, talk to your doctor, pharmacist, or health care provider.  © 2024 [employer] (2024-02-25 00:00:00)

## 2024-09-04 LAB
ALBUMIN SERPL-MCNC: 4 G/DL (ref 3.5–5.2)
ALBUMIN/GLOB SERPL: 1.5 G/DL
ALP SERPL-CCNC: 93 U/L (ref 39–117)
ALT SERPL W P-5'-P-CCNC: 24 U/L (ref 1–41)
ANION GAP SERPL CALCULATED.3IONS-SCNC: 10.2 MMOL/L (ref 5–15)
AST SERPL-CCNC: 29 U/L (ref 1–40)
BASOPHILS # BLD AUTO: 0.12 10*3/MM3 (ref 0–0.2)
BASOPHILS NFR BLD AUTO: 1 % (ref 0–1.5)
BILIRUB SERPL-MCNC: 0.3 MG/DL (ref 0–1.2)
BUN SERPL-MCNC: 16 MG/DL (ref 8–23)
BUN/CREAT SERPL: 20 (ref 7–25)
CALCIUM SPEC-SCNC: 9.3 MG/DL (ref 8.6–10.5)
CHLORIDE SERPL-SCNC: 109 MMOL/L (ref 98–107)
CO2 SERPL-SCNC: 22.8 MMOL/L (ref 22–29)
CREAT SERPL-MCNC: 0.8 MG/DL (ref 0.76–1.27)
CRP SERPL-MCNC: <0.3 MG/DL (ref 0–0.5)
DEPRECATED RDW RBC AUTO: 45.1 FL (ref 37–54)
EGFRCR SERPLBLD CKD-EPI 2021: 98.2 ML/MIN/1.73
EOSINOPHIL # BLD AUTO: 0.99 10*3/MM3 (ref 0–0.4)
EOSINOPHIL NFR BLD AUTO: 8.2 % (ref 0.3–6.2)
ERYTHROCYTE [DISTWIDTH] IN BLOOD BY AUTOMATED COUNT: 14.1 % (ref 12.3–15.4)
ERYTHROCYTE [SEDIMENTATION RATE] IN BLOOD: 13 MM/HR (ref 0–20)
GLOBULIN UR ELPH-MCNC: 2.6 GM/DL
GLUCOSE SERPL-MCNC: 84 MG/DL (ref 65–99)
HCT VFR BLD AUTO: 43.7 % (ref 37.5–51)
HGB BLD-MCNC: 15.1 G/DL (ref 13–17.7)
IMM GRANULOCYTES # BLD AUTO: 0.04 10*3/MM3 (ref 0–0.05)
IMM GRANULOCYTES NFR BLD AUTO: 0.3 % (ref 0–0.5)
LYMPHOCYTES # BLD AUTO: 3.02 10*3/MM3 (ref 0.7–3.1)
LYMPHOCYTES NFR BLD AUTO: 25 % (ref 19.6–45.3)
MCH RBC QN AUTO: 30.8 PG (ref 26.6–33)
MCHC RBC AUTO-ENTMCNC: 34.6 G/DL (ref 31.5–35.7)
MCV RBC AUTO: 89 FL (ref 79–97)
MONOCYTES # BLD AUTO: 1.11 10*3/MM3 (ref 0.1–0.9)
MONOCYTES NFR BLD AUTO: 9.2 % (ref 5–12)
NEUTROPHILS NFR BLD AUTO: 56.3 % (ref 42.7–76)
NEUTROPHILS NFR BLD AUTO: 6.82 10*3/MM3 (ref 1.7–7)
NRBC BLD AUTO-RTO: 0 /100 WBC (ref 0–0.2)
PLATELET # BLD AUTO: 198 10*3/MM3 (ref 140–450)
PMV BLD AUTO: 10.7 FL (ref 6–12)
POTASSIUM SERPL-SCNC: 4.1 MMOL/L (ref 3.5–5.2)
PROT SERPL-MCNC: 6.6 G/DL (ref 6–8.5)
RBC # BLD AUTO: 4.91 10*6/MM3 (ref 4.14–5.8)
SODIUM SERPL-SCNC: 142 MMOL/L (ref 136–145)
WBC NRBC COR # BLD AUTO: 12.1 10*3/MM3 (ref 3.4–10.8)

## 2025-01-13 ENCOUNTER — TELEPHONE (OUTPATIENT)
Age: 65
End: 2025-01-13

## 2025-01-13 ENCOUNTER — OFFICE VISIT (OUTPATIENT)
Age: 65
End: 2025-01-13
Payer: MEDICARE

## 2025-01-13 VITALS
WEIGHT: 188.3 LBS | TEMPERATURE: 97.3 F | SYSTOLIC BLOOD PRESSURE: 158 MMHG | DIASTOLIC BLOOD PRESSURE: 92 MMHG | HEART RATE: 76 BPM | HEIGHT: 71 IN | BODY MASS INDEX: 26.36 KG/M2

## 2025-01-13 DIAGNOSIS — Z79.899 HIGH RISK MEDICATION USE: ICD-10-CM

## 2025-01-13 DIAGNOSIS — D84.821 IMMUNOSUPPRESSION DUE TO DRUG THERAPY: ICD-10-CM

## 2025-01-13 DIAGNOSIS — M05.79 RHEUMATOID ARTHRITIS INVOLVING MULTIPLE SITES WITH POSITIVE RHEUMATOID FACTOR: Primary | ICD-10-CM

## 2025-01-13 DIAGNOSIS — Z79.899 IMMUNOSUPPRESSION DUE TO DRUG THERAPY: ICD-10-CM

## 2025-01-13 PROCEDURE — 1160F RVW MEDS BY RX/DR IN RCRD: CPT | Performed by: INTERNAL MEDICINE

## 2025-01-13 PROCEDURE — G2211 COMPLEX E/M VISIT ADD ON: HCPCS | Performed by: INTERNAL MEDICINE

## 2025-01-13 PROCEDURE — 1159F MED LIST DOCD IN RCRD: CPT | Performed by: INTERNAL MEDICINE

## 2025-01-13 PROCEDURE — 99214 OFFICE O/P EST MOD 30 MIN: CPT | Performed by: INTERNAL MEDICINE

## 2025-01-13 RX ORDER — METHOTREXATE 25 MG/ML
25 INJECTION, SOLUTION INTRAMUSCULAR; INTRATHECAL; INTRAVENOUS; SUBCUTANEOUS WEEKLY
Qty: 12 ML | Refills: 0 | Status: SHIPPED | OUTPATIENT
Start: 2025-01-13

## 2025-01-13 RX ORDER — SYRINGE WITH NEEDLE, 1 ML 28GX1/2"
1 SYRINGE, EMPTY DISPOSABLE MISCELLANEOUS WEEKLY
Qty: 12 EACH | Refills: 3 | Status: SHIPPED | OUTPATIENT
Start: 2025-01-13

## 2025-01-13 NOTE — PROGRESS NOTES
Office Follow Up      Date: 01/13/2025   Patient Name: Terry Oconnor  MRN: [id number]  YOB: 1960    Referring Physician: Scout Ochoa PA     Chief Complaint:   Chief Complaint   Patient presents with    Rheumatoid Arthritis       History of Present Illness: Terry Oconnor is a 64 y.o. male who is here today for follow up on seropositive rheumatoid arthritis.     Continues on subcu methotrexate.  Gets Rinvoq through [employer] assistance.  He has multiple rheumatoid nodules over his joints including hands, elbows, ankles, feet.  Worsening pain and swelling in his foot recently  His legs feel weak.  Frequently dizzy.  Blood pressure is elevated today.  Has not seen his PCP in years  He felt the Xeljanz worked better than Rinvoq for him previously.    He continues to enjoy fishing with his son      Subjective       Review of Systems: Review of Systems   Constitutional:  Negative for chills, fatigue, fever and unexpected weight loss.   HENT:  Negative for mouth sores, sinus pressure and sore throat.    Eyes:  Negative for pain and redness.   Respiratory:  Negative for cough and shortness of breath.    Cardiovascular:  Negative for chest pain.   Gastrointestinal:  Negative for abdominal pain, blood in stool, diarrhea, nausea, vomiting and GERD.   Endocrine: Negative for polydipsia and polyuria.   Genitourinary:  Negative for dysuria, genital sores and hematuria.   Musculoskeletal:  Positive for joint swelling. Negative for arthralgias, back pain, myalgias, neck pain and neck stiffness.   Skin:  Negative for rash and bruise.   Allergic/Immunologic: Negative for immunocompromised state.   Neurological:  Negative for seizures, weakness, numbness and memory problem.   Hematological:  Negative for adenopathy. Does not bruise/bleed easily.   Psychiatric/Behavioral:  Negative for depressed mood. The patient is not nervous/anxious.         Medications:   Current Outpatient Medications:      "ASPIRIN 81 PO, Take 1 tablet by mouth Daily. Aspir-81 mg tablet,delayed release, Disp: , Rfl:     diclofenac (VOLTAREN) 75 MG EC tablet, Take 1 tablet by mouth 2 (Two) Times a Day. for joint pain, Disp: 60 tablet, Rfl: 5    folic acid (FOLVITE) 1 MG tablet, Take 1 tablet by mouth Daily., Disp: 90 tablet, Rfl: 3    Methotrexate Sodium 50 MG/2ML injection, Inject 1 mL under the skin into the appropriate area as directed 1 (One) Time Per Week., Disp: 12 mL, Rfl: 0    Tuberculin-Allergy Syringes (B-D ALLERGY SYRINGE 1CC/28G) 28G X 1/2\" 1 ML misc, Use 1 each 1 (One) Time Per Week. use once a week with methotrexate injection, Disp: 12 each, Rfl: 3    Upadacitinib ER (Rinvoq) 15 MG tablet sustained-release 24 hour, Take 1 tablet by mouth Daily., Disp: 30 tablet, Rfl: 5    Allergies: No Known Allergies    I have reviewed and updated the patient's chief complaint, history of present illness, review of systems, past medical history, surgical history, family history, social history, medications and allergy list as appropriate.     Objective        Vital Signs:   Vitals:    01/13/25 1634   BP: 158/92   BP Location: Left arm   Patient Position: Sitting   Cuff Size: Adult   Pulse: 76   Temp: 97.3 °F (36.3 °C)   Weight: 85.4 kg (188 lb 4.8 oz)   Height: 179.1 cm (70.5\")   PainSc:   6       Body mass index is 26.64 kg/m².      Physical Exam:  Physical Exam   MUSCULOSKELETAL:     Rheumatoid nodules scattered over the finger joints  Large rheumatoid nodules present extensor surface right elbow  No synovitis/pain in the hands wrist or upper joints.  Hammertoes present bilateral feet    Complete joint exam was performed including the MCPs, PIPs, DIPs of the hands, wrists, elbows, shoulders, hips, knees and ankles.  No soft tissue swelling or tenderness is present except as above.    General: The patient is well-developed and well nourished. Cooperative, alert and oriented. Affect is normal. Hydration appears normal.   HEENT: " "Normocephalic and atraumatic. No notable alopecia. Lids and conjunctiva are normal. Pupils are equal and sclera are clear. Oropharynx is clear   NECK neck is supple without adenopathy, masses or thyromegaly.   CARDIOVASCULAR: Regular rate and rhythm. No murmurs, rubs or gallops   LUNGS: Effort is normal. Lungs are clear bilateral   ABDOMEN: Not examined  EXTREMITIES: Peripheral pulses are intact. No clubbing.   SKIN: No rashes. No subcutaneous nodules. No digital ulcers. No sclerodactyly.   NEUROLOGIC: Gait is normal. Strength testing is normal.  No focal neurologic deficits          Results Review:   Labs:   Lab Results   Component Value Date    GLUCOSE 84 09/03/2024    BUN 16 09/03/2024    CREATININE 0.80 09/03/2024    EGFR 98.2 09/03/2024    BCR 20.0 09/03/2024    K 4.1 09/03/2024    CO2 22.8 09/03/2024    CALCIUM 9.3 09/03/2024    ALBUMIN 4.0 09/03/2024    BILITOT 0.3 09/03/2024    AST 29 09/03/2024    ALT 24 09/03/2024     Lab Results   Component Value Date    WBC 12.10 (H) 09/03/2024    HGB 15.1 09/03/2024    HCT 43.7 09/03/2024    MCV 89.0 09/03/2024     09/03/2024     Lab Results   Component Value Date    SEDRATE 13 09/03/2024     Lab Results   Component Value Date    CRP <0.30 09/03/2024     Lab Results   Component Value Date    QUANTIFERO Comment 05/28/2024    QUANTITB1 0.02 05/28/2024    QUANTITB2 0.05 05/28/2024    QUANTIFERN 0.01 05/28/2024    QUANTIFERM >10.00 05/28/2024    QUANTITBGLDP Negative 05/28/2024     No results found for: \"RF\"  No results found for: \"HEPBSAG\", \"HEPAIGM\", \"HEPBIGMCORE\", \"HEPCVIRUSABY\"      Procedures    Assessment / Plan        Rheumatoid arthritis involving multiple sites with positive rheumatoid factor  Fishes in [place]; son in his 20s   CCP+; + RA start 2010; severely nodular  **Current:  SubQ methotrexate 2011,                        Rinvoq 8/22([employer] assistance)    Prior Xeljanz XR ( 6/2020-8/22 expensive and stopped working)  Prior Humira 10/19-6/2020 ( " no response)  Prior 1 month Xeljanz 8/26/2019 -insurance denied. Restarted 6/2020  prior Plaquenil started October 26 2016-8.18 (no eye exams)  ?Rituxan 2018- approved but patient declined due to cost       Overall low disease activity today.    On Rinvoq through [employer] assistance and methotrexate once weekly through mail order pharmacy  -Still having a lot of foot pain that I think is more OA related.  He thinks Xeljanz worked better than Rinvoq in the past and is interested in switching Cesario inhibitors  -Will prior authorize for switch to Xeljanz or Olumiant from present Rinvoq  -Continue on once weekly methotrexate  Medication refilled.    Labs 9/3/2024 reviewed and are stable  Continue labs every 3 months for monitoring.  Standing order placed.  No signs of toxicity.  Continue p.r.n. NSAID.     BP is elevated today.  -Recommend he follow-up with his PCP for hypertension as he has not seen his PCP in years.  Return to clinic 3-4 months  His wife is present throughout exam and discussion today     High risk medication use  MTX Rinvoq  Hepatitis panel - 2/16/2023  Q TB -5/28/2024     Well tolerated and effective. Labs every 3 months for monitoring CBC CMP  -Intermittent lipid panel for monitoring on Rinvoq  Risks of methotrexate include but are not limited to severe liver damage that can be fatal, the possible need for liver biopsy, bone marrow suppression that can lead to dangerously low blood counts, GI side effects including mouth sores and diarrhea, fatigue, and rare risk of severe pulmonary complications. There should be no alcohol consumed with MTX. MTX can cause severe fetal abnormalities whether the mother or father is taking the medication and thus must be avoided if pregnancy is a possibility.  All medication is to be taken one day a week only. The need for q 8-12 week labs and the need for folic acid supplementation were discussed..        Rinvoq has the risk of lymphopenia, neutropenia, anemia, elevation  "of liver enzymes, elevation of lipids, serious infection, GI perforations, viral reactivation,TB reactivation, malignancies, and renal abnormalities.  We also discussed black box warnings of increased cardiovascular risks and increased risk of cancers.  Live vaccines should not be taken with this medication. Regular monitoring is required and compliance with this is imperative. This was all discussed at length.    Immunosuppression due to drug therapy     Generalized osteoarthrosis, involving multiple sites  Continue p.r.n. NSAID  Risks of NSAIDs discussed including GI upset, GI bleeding, renal and hepatic risks and the risks of cardiovascular disease and stroke. Warned patient not to take with other NSAIDs including OTC NSAIDs..        Assessment & Plan  Rheumatoid arthritis involving multiple sites with positive rheumatoid factor    High risk medication use    Immunosuppression due to drug therapy      Orders Placed This Encounter   Procedures    C-reactive Protein    CBC Auto Differential    Comprehensive Metabolic Panel    Sedimentation Rate    Comprehensive Metabolic Panel    CBC Auto Differential    C-reactive Protein    Sedimentation Rate       New Medications Ordered This Visit   Medications    Methotrexate Sodium 50 MG/2ML injection     Sig: Inject 1 mL under the skin into the appropriate area as directed 1 (One) Time Per Week.     Dispense:  12 mL     Refill:  0    Tuberculin-Allergy Syringes (B-D ALLERGY SYRINGE 1CC/28G) 28G X 1/2\" 1 ML misc     Sig: Use 1 each 1 (One) Time Per Week. use once a week with methotrexate injection     Dispense:  12 each     Refill:  3             Follow Up:   Return in about 4 months (around 5/13/2025).        Jabari White MD  [facility]   "

## 2025-01-13 NOTE — TELEPHONE ENCOUNTER
Prior auth either xeljanz or olumiant for RA;  currently on rinvoq and he wishes to switch Kalia  On MTX

## 2025-01-14 NOTE — TELEPHONE ENCOUNTER
Prior authorization initiated by [facility].  Update will be provided when a determination has been received.     Medication: Xeljanz XR 11mg  PA Submission Method: CMM  Case Number/CMM Key: [id number]

## 2025-01-14 NOTE — TELEPHONE ENCOUNTER
PA request has been approved and pharmacy notified (Filling pharmacy will be notified by phone, fax, or submitted prescription)    Authorized Medication: Xeljanz  Name of Insurance Approving PA: Wally  Pharmacy PA Number: [id number]  PA Effective Dates: 1.1.25-1.14.26  Dispensing Pharmacy: can will with [facility] but will benefit from M3P plan    (Changing therapy from Rinvoq to Xeljanz, was getting Rinvoq through PAP)

## 2025-01-16 ENCOUNTER — SPECIALTY PHARMACY (OUTPATIENT)
Age: 65
End: 2025-01-16
Payer: MEDICARE

## 2025-01-16 RX ORDER — TOFACITINIB 11 MG/1
11 TABLET, FILM COATED, EXTENDED RELEASE ORAL DAILY
COMMUNITY

## 2025-02-12 ENCOUNTER — SPECIALTY PHARMACY (OUTPATIENT)
Facility: HOSPITAL | Age: 65
End: 2025-02-12
Payer: MEDICARE

## 2025-02-12 RX ORDER — TOFACITINIB 11 MG/1
11 TABLET, FILM COATED, EXTENDED RELEASE ORAL DAILY
Qty: 30 TABLET | Refills: 5 | Status: SHIPPED | OUTPATIENT
Start: 2025-02-12

## 2025-02-12 NOTE — PROGRESS NOTES
Specialty Pharmacy Patient Management Program  Per Protocol Prescription Order/Refill     Patient currently fills medications at [facility] and is enrolled in an Rheumatology Patient Management Program.     Requested Prescriptions     Signed Prescriptions Disp Refills    Tofacitinib Citrate ER (Xeljanz XR) 11 MG tablet sustained-release 24 hour 30 tablet 5     Sig: Take 1 tablet by mouth Daily.     Prescription orders above were sent to the pharmacy per Collaborative Care Agreement Protocol.     Moira Hopkins PharmD, BCPS  Clinical Specialty Pharmacist, Rheumatology   2/12/2025  09:53 EST

## 2025-03-31 ENCOUNTER — SPECIALTY PHARMACY (OUTPATIENT)
Age: 65
End: 2025-03-31
Payer: MEDICARE

## 2025-05-13 ENCOUNTER — LAB (OUTPATIENT)
Facility: HOSPITAL | Age: 65
End: 2025-05-13
Payer: MEDICARE

## 2025-05-13 ENCOUNTER — OFFICE VISIT (OUTPATIENT)
Age: 65
End: 2025-05-13
Payer: MEDICARE

## 2025-05-13 ENCOUNTER — TELEPHONE (OUTPATIENT)
Age: 65
End: 2025-05-13

## 2025-05-13 VITALS
SYSTOLIC BLOOD PRESSURE: 138 MMHG | HEIGHT: 71 IN | DIASTOLIC BLOOD PRESSURE: 86 MMHG | HEART RATE: 80 BPM | TEMPERATURE: 97.6 F | WEIGHT: 187.4 LBS | BODY MASS INDEX: 26.23 KG/M2

## 2025-05-13 DIAGNOSIS — Z79.899 IMMUNOSUPPRESSION DUE TO DRUG THERAPY: ICD-10-CM

## 2025-05-13 DIAGNOSIS — Z79.899 HIGH RISK MEDICATION USE: ICD-10-CM

## 2025-05-13 DIAGNOSIS — D84.821 IMMUNOSUPPRESSION DUE TO DRUG THERAPY: ICD-10-CM

## 2025-05-13 DIAGNOSIS — M05.79 RHEUMATOID ARTHRITIS INVOLVING MULTIPLE SITES WITH POSITIVE RHEUMATOID FACTOR: Primary | ICD-10-CM

## 2025-05-13 DIAGNOSIS — M05.79 RHEUMATOID ARTHRITIS INVOLVING MULTIPLE SITES WITH POSITIVE RHEUMATOID FACTOR: ICD-10-CM

## 2025-05-13 PROCEDURE — 36415 COLL VENOUS BLD VENIPUNCTURE: CPT

## 2025-05-13 PROCEDURE — 82550 ASSAY OF CK (CPK): CPT

## 2025-05-13 PROCEDURE — 85025 COMPLETE CBC W/AUTO DIFF WBC: CPT

## 2025-05-13 PROCEDURE — 86140 C-REACTIVE PROTEIN: CPT

## 2025-05-13 PROCEDURE — 80053 COMPREHEN METABOLIC PANEL: CPT

## 2025-05-13 PROCEDURE — 85652 RBC SED RATE AUTOMATED: CPT

## 2025-05-13 RX ORDER — CELECOXIB 200 MG/1
200 CAPSULE ORAL 2 TIMES DAILY PRN
Qty: 60 CAPSULE | Refills: 5 | Status: SHIPPED | OUTPATIENT
Start: 2025-05-13

## 2025-05-13 RX ORDER — UPADACITINIB 15 MG/1
TABLET, EXTENDED RELEASE ORAL
COMMUNITY
End: 2025-05-13

## 2025-05-13 RX ORDER — TOFACITINIB 11 MG/1
11 TABLET, FILM COATED, EXTENDED RELEASE ORAL DAILY
COMMUNITY

## 2025-05-13 NOTE — TELEPHONE ENCOUNTER
Pt wants to restart Xeljanz but needs assistance through [employer]. Currently he was told it would cost him 2000$ per year which he is willing to spend.  Apparently [employer] application he sent recently missing some info.  Please assist.  Thanks!

## 2025-05-13 NOTE — PATIENT INSTRUCTIONS
Rheumatoid Arthritis    Rheumatoid arthritis (RA) is a long-term (chronic) disease that causes inflammation in the joints. RA may start slowly. It most often affects the small joints of the hands and feet. Usually, the same joints are affected on both sides of the body. Inflammation from RA can also affect other parts of the body, including the heart, eyes, or lungs.  There is no cure for RA, but medicines can help your symptoms and stop or slow down the progression of the disease.    What are the causes?  RA is an autoimmune disease. When you have an autoimmune disease, your body's defense system (immune system) mistakenly attacks healthy body tissues. The exact cause of RA is not known.    What increases the risk?  The following factors may make you more likely to develop this condition:  Being female.  Having a family history of RA or other autoimmune diseases.  Having a history of smoking.  Being obese.  Having been exposed to pollutants or chemicals.    What are the signs or symptoms?  Symptoms of this condition usually start gradually. They are often worse in the morning. The first symptom may be morning stiffness that lasts longer than 30 minutes.    As RA progresses, symptoms may include:  Pain, stiffness, swelling, warmth, and tenderness in joints on both sides of your body.  Loss of energy.  Loss of appetite.  Weight loss.  Low-grade fever.  Dry eyes and dry mouth.  Firm lumps (rheumatoid nodules) that grow beneath your skin in areas such as your forearm bones near your elbows and on your hands.  Changes in the appearance of joints (deformity) and loss of joint function.    Symptoms of this condition vary from person to person.  Symptoms of RA often come and go.  Sometimes, symptoms get worse for a period of time. These are called flares.    How is this diagnosed?  This condition is diagnosed based on your symptoms, medical history, and a physical exam. You may have X-rays or an MRI to check for the type  "of joint changes that are caused by RA.  You may also have blood tests to look for:  Proteins (antibodies) that your immune system may make if you have RA. These include rheumatoid factor (RF) and anti-CCP.  When blood tests show these proteins, you are said to have \"seropositive RA.\"  When blood tests do not show these proteins, you may have \"seronegative RA.\"  Inflammation in your blood.  A low number of red blood cells (anemia).    How is this treated?    The goals of treatment are to relieve pain, reduce inflammation, and slow down or stop joint damage and disability. Treatment may include:  Lifestyle changes. It is important to rest as needed, eat a healthy diet, and exercise.  Medicines. Your health care provider may adjust your medicines every 3 months until treatment goals are reached. Common medicines include:  Pain relievers (analgesics).  Corticosteroids and NSAIDs, such as ibuprofen, to reduce inflammation.  Disease-modifying antirheumatic drugs (DMARDs) to try to slow the course of the disease.  Biologic response modifiers to reduce inflammation and damage.  Physical therapy and occupational therapy.  Surgery, if you have severe joint damage. Joint replacement or fusing of joints may be needed.  Your health care provider will work with you to identify the best treatment option for you based on assessment of the overall disease activity in your body.    Follow these instructions at home:    Managing pain, stiffness, and swelling    If directed, apply heat to the affected area as often as told by your health care provider. Use the heat source that your health care provider recommends, such as a moist heat pack or a heating pad.  Place a towel between your skin and the heat source.  Leave the heat on for 20-30 minutes.  Remove the heat if your skin turns bright red. This is especially important if you are unable to feel pain, heat, or cold. You have a greater risk of getting burned.    Activity  Return to " your normal activities as told by your health care provider. Ask your health care provider what activities are safe for you.  Rest when you are having a flare.  Start an exercise program as told by your health care provider. This may include physical therapy exercises to maintain movement and strength in your joints.    General instructions  Take over-the-counter and prescription medicines only as told by your health care provider.  Keep all follow-up visits. This is important.    Where to find more information  American College of Rheumatology: rheumatology.org  Arthritis Foundation: arthritis.org    Contact a health care provider if:  You have a flare-up of RA symptoms.  You have a fever.  You have side effects from your medicines.    Get help right away if:  You have chest pain.  You have trouble breathing.  You quickly develop a hot, painful joint that is more severe than your usual joint aches.  These symptoms may be an emergency. Get help right away. Call 911.  Do not wait to see if the symptoms will go away.  Do not drive yourself to the hospital.    Summary  Rheumatoid arthritis (RA) is a long-term (chronic) disease that causes inflammation in the joints.  RA is an autoimmune disease.  The goals of treatment are to relieve pain, reduce inflammation, and slow down or stop joint damage and disability.    This information is not intended to replace advice given to you by your health care provider. Make sure you discuss any questions you have with your health care provider.  Document Revised: 10/20/2022 Document Reviewed: 10/20/2022  [employer] Patient Education © 2024 [employer]

## 2025-05-13 NOTE — PROGRESS NOTES
Office Follow Up      Date: 05/13/2025   Patient Name: Terry Oconnor  MRN: [id number]  YOB: 1960    Referring Physician: No ref. provider found     Chief Complaint:   Chief Complaint   Patient presents with    Rheumatoid Arthritis       History of Present Illness: Terry Oconnor is a 64 y.o. male who is here today for follow up on seropositive rheumatoid arthritis.      Reports he did not tolerate methotrexate gets Rinvoq through [employer] assistance.  Feels it makes him dizzy.  He has multiple rheumatoid nodules over his joints including hands, elbows, ankles, feet.  Worsening pain and swelling in his hands and feet.  His legs feel weak.  Frequently dizzy.    He felt the Xeljanz worked better than Rinvoq for him previously.  He wants to switch back to Xeljanz     He continues to enjoy fishing with his son    History of Present Illness        Subjective       Review of Systems: Review of Systems   Constitutional:  Negative for chills, fatigue, fever and unexpected weight loss.   HENT:  Negative for mouth sores, sinus pressure and sore throat.    Eyes:  Negative for pain and redness.   Respiratory:  Negative for cough and shortness of breath.    Cardiovascular:  Negative for chest pain.   Gastrointestinal:  Negative for abdominal pain, blood in stool, diarrhea, nausea, vomiting and GERD.   Endocrine: Negative for polydipsia and polyuria.   Genitourinary:  Negative for dysuria, genital sores and hematuria.   Musculoskeletal:  Positive for arthralgias, back pain and joint swelling. Negative for myalgias, neck pain and neck stiffness.   Skin:  Negative for rash and bruise.   Neurological:  Negative for seizures, weakness, numbness and memory problem.   Hematological:  Negative for adenopathy. Does not bruise/bleed easily.   Psychiatric/Behavioral:  Negative for depressed mood. The patient is not nervous/anxious.         Past Medical History:   Past Medical History:   Diagnosis Date  "   Chronic back pain     Fatigue     High risk medication use     Osteoarthritis     Rheumatoid arthritis        Past Surgical History: History reviewed. No pertinent surgical history.    Family History: History reviewed. No pertinent family history.    Social History:   Social History     Socioeconomic History    Marital status:    Tobacco Use    Smoking status: Every Day     Types: Cigarettes    Smokeless tobacco: Never   Vaping Use    Vaping status: Never Used   Substance and Sexual Activity    Alcohol use: Never    Drug use: Never    Sexual activity: Defer       Medications:   Current Outpatient Medications:     ASPIRIN 81 PO, Take 1 tablet by mouth Daily. Aspir-81 mg tablet,delayed release, Disp: , Rfl:     Tofacitinib Citrate ER (Xeljanz XR) 11 MG tablet sustained-release 24 hour, Take 1 tablet by mouth Daily., Disp: , Rfl:     celecoxib (CeleBREX) 200 MG capsule, Take 1 capsule by mouth 2 (Two) Times a Day As Needed for Mild Pain., Disp: 60 capsule, Rfl: 5    Allergies: No Known Allergies        Objective        Vital Signs:   Vitals:    05/13/25 1546   BP: 138/86   BP Location: Left arm   Patient Position: Sitting   Cuff Size: Adult   Pulse: 80   Temp: 97.6 °F (36.4 °C)   Weight: 85 kg (187 lb 6.4 oz)   Height: 179.1 cm (70.5\")   PainSc: 3      Body mass index is 26.51 kg/m².      Physical Exam:  Physical Exam   MUSCULOSKELETAL:   Tender swollen second MCP joint bilateral hands  Rheumatoid nodules scattered over the finger joints  Large rheumatoid nodules present extensor surface right elbow  Hammertoes present bilateral feet    Complete joint exam was performed including the MCPs, PIPs, DIPs of the hands, wrists, elbows, shoulders, hips, knees and ankles.  No soft tissue swelling or tenderness is present except as above.    General: The patient is well-developed and well nourished. Cooperative, alert and oriented. Affect is normal. Hydration appears normal.   HEENT: Normocephalic and atraumatic. Lids " "and conjunctiva are normal. Pupils are equal and sclera are clear. Oropharynx is clear   NECK neck is supple without adenopathy, masses or thyromegaly.   CARDIOVASCULAR: Regular rate and rhythm. No murmurs, rubs or gallops   LUNGS: Effort is normal. Lungs are clear bilateral   ABDOMEN: Not examined  EXTREMITIES: Peripheral pulses are intact. No clubbing.   SKIN: No rashes. No subcutaneous nodules. No digital ulcers. No sclerodactyly.   NEUROLOGIC: Gait is normal. Strength testing is normal.  No focal neurologic deficits    Results Review:   Labs:   Lab Results   Component Value Date    GLUCOSE 84 09/03/2024    BUN 16 09/03/2024    CREATININE 0.80 09/03/2024    EGFR 98.2 09/03/2024    BCR 20.0 09/03/2024    K 4.1 09/03/2024    CO2 22.8 09/03/2024    CALCIUM 9.3 09/03/2024    ALBUMIN 4.0 09/03/2024    BILITOT 0.3 09/03/2024    AST 29 09/03/2024    ALT 24 09/03/2024     Lab Results   Component Value Date    WBC 12.10 (H) 09/03/2024    HGB 15.1 09/03/2024    HCT 43.7 09/03/2024    MCV 89.0 09/03/2024     09/03/2024     Lab Results   Component Value Date    SEDRATE 13 09/03/2024     Lab Results   Component Value Date    CRP <0.30 09/03/2024     Lab Results   Component Value Date    QUANTIFERO Comment 05/28/2024    QUANTITB1 0.02 05/28/2024    QUANTITB2 0.05 05/28/2024    QUANTIFERN 0.01 05/28/2024    QUANTIFERM >10.00 05/28/2024    QUANTITBGLDP Negative 05/28/2024     No results found for: \"RF\"  No results found for: \"HEPBSAG\", \"HEPAIGM\", \"HEPBIGMCORE\", \"HEPCVIRUSABY\"      Procedures    Assessment / Plan      -Rheumatoid arthritis involving multiple sites with positive rheumatoid factor  Fishes in [place]; son in his 20s   CCP+; + RA start 2010; severely nodular  **Current: Xeljanz, celecoxib    Prior therapies:  SubQ methotrexate 2011(reports intolerance), diclofenac  Rinvoq 8/22([employer] assistance)-5/25(dizzy)  Prior Xeljanz XR ( 6/2020-8/22 expensive and stopped working)  Prior Humira 10/19-6/2020 ( no " response)  Prior 1 month Xeljanz 8/26/2019 -insurance denied. Restarted 6/2020  prior Plaquenil started October 26 2016-8.18 (no eye exams)  ?Rituxan 2018- approved but patient declined due to cost        Moderate disease activity.on Rinvoq through [employer] assistance   He thinks Xeljanz worked better than Rinvoq in the past and wishes to switch back to Xeljanz  -Stop Rinvoq and start Xeljanz.  2 bottles of Xeljanz 5 mg twice daily samples provided  -Will prior authorize for switch from present Rinvoq back to Xeljanz.  Message sent to specialty pharmacy  -Switch NSAID from diclofenac to celecoxib  Medication refilled.    Labs ordered for monitoring CBC CMP sed rate CRP and update Q TB  Continue labs every 3 months for monitoring.  Standing order placed.  No signs of toxicity.  Return to clinic 3 months  His wife Nuzhat is present throughout exam and discussion today     -High risk medication use  -Immunosuppression due to drug therapy    Hepatitis panel - 2/16/2023  Q TB -5/28/2024, update 5/13/2025     Well tolerated and effective. Labs every 3 months for monitoring CBC CMP sed rate CRP  -Intermittent lipid panel for monitoring   Xeljanz has the risk of lymphopenia, neutropenia, anemia, elevation of liver enzymes, elevation of lipids, serious infection, GI perforations, viral reactivation,TB reactivation, malignancies, and renal abnormalities.  We also discussed black box warnings of increased cardiovascular risks and increased risk of cancers.  Live vaccines should not be taken with this medication. Regular monitoring is required and compliance with this is imperative. This was all discussed at length.        -Generalized osteoarthrosis  Continue p.r.n. NSAID  Risks of NSAIDs discussed including GI upset, GI bleeding, renal and hepatic risks and the risks of cardiovascular disease and stroke. Warned patient not to take with other NSAIDs including OTC NSAIDs..         1. Rheumatoid arthritis involving multiple  sites with positive rheumatoid factor    2. High risk medication use    3. Immunosuppression due to drug therapy        Assessment & Plan        Orders Placed This Encounter   Procedures    CBC Auto Differential    Comprehensive Metabolic Panel    C-reactive Protein    Sedimentation Rate    QuantiFERON-TB Gold Plus    Comprehensive Metabolic Panel    CBC Auto Differential    C-reactive Protein    Sedimentation Rate     New Medications Ordered This Visit   Medications    celecoxib (CeleBREX) 200 MG capsule     Sig: Take 1 capsule by mouth 2 (Two) Times a Day As Needed for Mild Pain.     Dispense:  60 capsule     Refill:  5       Follow Up:   Return in about 3 months (around 8/13/2025) for Followup APRN.      Discussed plan of care in detail with the patient today.  Patient verbalized understanding and agrees.    I confirm accuracy of unchanged data/findings which have been carried forward from previous visit.  I have updated appropriately those that have changed.        Jabari White MD  [facility] [place] [facility]

## 2025-05-14 LAB
ALBUMIN SERPL-MCNC: 4.1 G/DL (ref 3.5–5.2)
ALBUMIN/GLOB SERPL: 1.3 G/DL
ALP SERPL-CCNC: 104 U/L (ref 39–117)
ALT SERPL W P-5'-P-CCNC: 24 U/L (ref 1–41)
ANION GAP SERPL CALCULATED.3IONS-SCNC: 9.9 MMOL/L (ref 5–15)
AST SERPL-CCNC: 37 U/L (ref 1–40)
BASOPHILS # BLD AUTO: 0.08 10*3/MM3 (ref 0–0.2)
BASOPHILS NFR BLD AUTO: 0.6 % (ref 0–1.5)
BILIRUB SERPL-MCNC: 0.3 MG/DL (ref 0–1.2)
BUN SERPL-MCNC: 18 MG/DL (ref 8–23)
BUN/CREAT SERPL: 17.8 (ref 7–25)
CALCIUM SPEC-SCNC: 9.3 MG/DL (ref 8.6–10.5)
CHLORIDE SERPL-SCNC: 106 MMOL/L (ref 98–107)
CK SERPL-CCNC: 606 U/L (ref 20–200)
CO2 SERPL-SCNC: 24.1 MMOL/L (ref 22–29)
CREAT SERPL-MCNC: 1.01 MG/DL (ref 0.76–1.27)
CRP SERPL-MCNC: <0.3 MG/DL (ref 0–0.5)
DEPRECATED RDW RBC AUTO: 41.5 FL (ref 37–54)
EGFRCR SERPLBLD CKD-EPI 2021: 82 ML/MIN/1.73
EOSINOPHIL # BLD AUTO: 0.56 10*3/MM3 (ref 0–0.4)
EOSINOPHIL NFR BLD AUTO: 4.5 % (ref 0.3–6.2)
ERYTHROCYTE [DISTWIDTH] IN BLOOD BY AUTOMATED COUNT: 12.7 % (ref 12.3–15.4)
ERYTHROCYTE [SEDIMENTATION RATE] IN BLOOD: 15 MM/HR (ref 0–20)
GLOBULIN UR ELPH-MCNC: 3.1 GM/DL
GLUCOSE SERPL-MCNC: 84 MG/DL (ref 65–99)
HCT VFR BLD AUTO: 44.8 % (ref 37.5–51)
HGB BLD-MCNC: 15.4 G/DL (ref 13–17.7)
IMM GRANULOCYTES # BLD AUTO: 0.08 10*3/MM3 (ref 0–0.05)
IMM GRANULOCYTES NFR BLD AUTO: 0.6 % (ref 0–0.5)
LYMPHOCYTES # BLD AUTO: 2.88 10*3/MM3 (ref 0.7–3.1)
LYMPHOCYTES NFR BLD AUTO: 23.2 % (ref 19.6–45.3)
MCH RBC QN AUTO: 30.6 PG (ref 26.6–33)
MCHC RBC AUTO-ENTMCNC: 34.4 G/DL (ref 31.5–35.7)
MCV RBC AUTO: 89.1 FL (ref 79–97)
MONOCYTES # BLD AUTO: 1.01 10*3/MM3 (ref 0.1–0.9)
MONOCYTES NFR BLD AUTO: 8.1 % (ref 5–12)
NEUTROPHILS NFR BLD AUTO: 63 % (ref 42.7–76)
NEUTROPHILS NFR BLD AUTO: 7.83 10*3/MM3 (ref 1.7–7)
NRBC BLD AUTO-RTO: 0 /100 WBC (ref 0–0.2)
PLATELET # BLD AUTO: 210 10*3/MM3 (ref 140–450)
PMV BLD AUTO: 10.6 FL (ref 6–12)
POTASSIUM SERPL-SCNC: 4 MMOL/L (ref 3.5–5.2)
PROT SERPL-MCNC: 7.2 G/DL (ref 6–8.5)
RBC # BLD AUTO: 5.03 10*6/MM3 (ref 4.14–5.8)
SODIUM SERPL-SCNC: 140 MMOL/L (ref 136–145)
WBC NRBC COR # BLD AUTO: 12.44 10*3/MM3 (ref 3.4–10.8)

## 2025-05-14 NOTE — TELEPHONE ENCOUNTER
LVM for patient to call back to discuss options for Xeljanz.     Of note, specialty pharmacy has reached out to the patient numerous times without success to assist the patient with PAP.

## 2025-05-15 ENCOUNTER — SPECIALTY PHARMACY (OUTPATIENT)
Age: 65
End: 2025-05-15
Payer: MEDICARE

## 2025-05-15 NOTE — TELEPHONE ENCOUNTER
Spoke with a [facility] representative, the patient needs to send in a confirmation letter that they signed up for the M3P plan. The patient has been made aware of this and instructed on how to sign up for M3P. F/U with the patient in 2 weeks.

## 2025-08-13 ENCOUNTER — TELEPHONE (OUTPATIENT)
Age: 65
End: 2025-08-13

## 2025-08-13 ENCOUNTER — OFFICE VISIT (OUTPATIENT)
Age: 65
End: 2025-08-13
Payer: MEDICARE

## 2025-08-13 VITALS
WEIGHT: 185 LBS | SYSTOLIC BLOOD PRESSURE: 138 MMHG | HEART RATE: 84 BPM | TEMPERATURE: 98 F | HEIGHT: 71 IN | DIASTOLIC BLOOD PRESSURE: 70 MMHG | BODY MASS INDEX: 25.9 KG/M2

## 2025-08-13 DIAGNOSIS — R20.2 BILATERAL NUMBNESS AND TINGLING OF ARMS AND LEGS: ICD-10-CM

## 2025-08-13 DIAGNOSIS — D84.821 IMMUNOSUPPRESSION DUE TO DRUG THERAPY: Chronic | ICD-10-CM

## 2025-08-13 DIAGNOSIS — Z79.899 HIGH RISK MEDICATION USE: Chronic | ICD-10-CM

## 2025-08-13 DIAGNOSIS — Z79.899 IMMUNOSUPPRESSION DUE TO DRUG THERAPY: Chronic | ICD-10-CM

## 2025-08-13 DIAGNOSIS — M15.9 GENERALIZED OSTEOARTHROSIS, INVOLVING MULTIPLE SITES: Chronic | ICD-10-CM

## 2025-08-13 DIAGNOSIS — M05.79 RHEUMATOID ARTHRITIS INVOLVING MULTIPLE SITES WITH POSITIVE RHEUMATOID FACTOR: Primary | Chronic | ICD-10-CM

## 2025-08-13 DIAGNOSIS — R20.0 BILATERAL NUMBNESS AND TINGLING OF ARMS AND LEGS: ICD-10-CM

## 2025-08-13 RX ORDER — DICLOFENAC SODIUM 75 MG/1
75 TABLET, DELAYED RELEASE ORAL 2 TIMES DAILY
Qty: 60 TABLET | Refills: 5 | Status: SHIPPED | OUTPATIENT
Start: 2025-08-13

## 2025-08-13 RX ORDER — LEFLUNOMIDE 20 MG/1
20 TABLET ORAL DAILY
Qty: 90 TABLET | Refills: 0 | Status: SHIPPED | OUTPATIENT
Start: 2025-08-13

## 2025-08-14 ENCOUNTER — SPECIALTY PHARMACY (OUTPATIENT)
Age: 65
End: 2025-08-14
Payer: MEDICARE

## 2025-08-14 ENCOUNTER — RESULTS FOLLOW-UP (OUTPATIENT)
Age: 65
End: 2025-08-14
Payer: MEDICARE

## 2025-08-14 LAB
ALBUMIN SERPL-MCNC: 4.1 G/DL (ref 3.9–4.9)
ALP SERPL-CCNC: 106 IU/L (ref 44–121)
ALT SERPL-CCNC: 22 IU/L (ref 0–44)
AST SERPL-CCNC: 28 IU/L (ref 0–40)
BILIRUB SERPL-MCNC: 0.3 MG/DL (ref 0–1.2)
BUN SERPL-MCNC: 20 MG/DL (ref 8–27)
BUN/CREAT SERPL: 23 (ref 10–24)
CALCIUM SERPL-MCNC: 9.2 MG/DL (ref 8.6–10.2)
CHLORIDE SERPL-SCNC: 105 MMOL/L (ref 96–106)
CK SERPL-CCNC: 301 U/L (ref 41–331)
CO2 SERPL-SCNC: 23 MMOL/L (ref 20–29)
CREAT SERPL-MCNC: 0.88 MG/DL (ref 0.76–1.27)
CRP SERPL-MCNC: 1 MG/L (ref 0–10)
EGFRCR SERPLBLD CKD-EPI 2021: 95 ML/MIN/1.73
ERYTHROCYTE [DISTWIDTH] IN BLOOD BY AUTOMATED COUNT: 13.2 % (ref 11.6–15.4)
ERYTHROCYTE [SEDIMENTATION RATE] IN BLOOD BY WESTERGREN METHOD: 27 MM/HR (ref 0–30)
GLOBULIN SER CALC-MCNC: 2.9 G/DL (ref 1.5–4.5)
GLUCOSE SERPL-MCNC: 82 MG/DL (ref 70–99)
HCT VFR BLD AUTO: 45.7 % (ref 37.5–51)
HGB BLD-MCNC: 15.1 G/DL (ref 13–17.7)
MAGNESIUM SERPL-MCNC: 2.1 MG/DL (ref 1.6–2.3)
MCH RBC QN AUTO: 29.4 PG (ref 26.6–33)
MCHC RBC AUTO-ENTMCNC: 33 G/DL (ref 31.5–35.7)
MCV RBC AUTO: 89 FL (ref 79–97)
PLATELET # BLD AUTO: 213 X10E3/UL (ref 150–450)
POTASSIUM SERPL-SCNC: 4.3 MMOL/L (ref 3.5–5.2)
PROT SERPL-MCNC: 7 G/DL (ref 6–8.5)
RBC # BLD AUTO: 5.13 X10E6/UL (ref 4.14–5.8)
SODIUM SERPL-SCNC: 141 MMOL/L (ref 134–144)
WBC # BLD AUTO: 11.3 X10E3/UL (ref 3.4–10.8)

## 2025-08-21 ENCOUNTER — SPECIALTY PHARMACY (OUTPATIENT)
Age: 65
End: 2025-08-21
Payer: MEDICARE

## 2025-08-21 RX ORDER — CERTOLIZUMAB PEGOL 200 MG/ML
400 INJECTION, SOLUTION SUBCUTANEOUS
COMMUNITY